# Patient Record
Sex: FEMALE | Employment: FULL TIME | URBAN - METROPOLITAN AREA
[De-identification: names, ages, dates, MRNs, and addresses within clinical notes are randomized per-mention and may not be internally consistent; named-entity substitution may affect disease eponyms.]

---

## 2019-09-24 ENCOUNTER — APPOINTMENT (OUTPATIENT)
Dept: VASCULAR SURGERY | Age: 49
End: 2019-09-24
Attending: EMERGENCY MEDICINE
Payer: OTHER MISCELLANEOUS

## 2019-09-24 ENCOUNTER — APPOINTMENT (OUTPATIENT)
Dept: GENERAL RADIOLOGY | Age: 49
End: 2019-09-24
Attending: EMERGENCY MEDICINE
Payer: OTHER MISCELLANEOUS

## 2019-09-24 ENCOUNTER — HOSPITAL ENCOUNTER (EMERGENCY)
Age: 49
Discharge: HOME | End: 2019-09-24
Attending: EMERGENCY MEDICINE
Payer: OTHER MISCELLANEOUS

## 2019-09-24 VITALS
SYSTOLIC BLOOD PRESSURE: 147 MMHG | RESPIRATION RATE: 18 BRPM | BODY MASS INDEX: 39.27 KG/M2 | DIASTOLIC BLOOD PRESSURE: 74 MMHG | WEIGHT: 230 LBS | HEIGHT: 64 IN | TEMPERATURE: 97.9 F | HEART RATE: 78 BPM | OXYGEN SATURATION: 100 %

## 2019-09-24 DIAGNOSIS — S62.102A CLOSED FRACTURE OF LEFT WRIST, INITIAL ENCOUNTER: ICD-10-CM

## 2019-09-24 DIAGNOSIS — S83.92XA SPRAIN OF LEFT KNEE, UNSPECIFIED LIGAMENT, INITIAL ENCOUNTER: ICD-10-CM
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
## 2019-09-24 PROCEDURE — 73564 X-RAY EXAM KNEE 4 OR MORE: CPT

## 2019-09-24 PROCEDURE — 73610 X-RAY EXAM OF ANKLE: CPT

## 2019-09-24 PROCEDURE — 99284 EMERGENCY DEPT VISIT MOD MDM: CPT

## 2019-09-24 PROCEDURE — 75810000053 HC SPLINT APPLICATION

## 2019-09-24 PROCEDURE — 73630 X-RAY EXAM OF FOOT: CPT

## 2019-09-24 PROCEDURE — 93922 UPR/L XTREMITY ART 2 LEVELS: CPT

## 2019-09-24 PROCEDURE — 73110 X-RAY EXAM OF WRIST: CPT

## 2019-09-24 PROCEDURE — 74011250637 HC RX REV CODE- 250/637: Performed by: EMERGENCY MEDICINE

## 2019-09-24 RX ORDER — HYDROCODONE BITARTRATE AND ACETAMINOPHEN 5; 325 MG/1; MG/1
2 TABLET ORAL
Status: COMPLETED | OUTPATIENT
Start: 2019-09-24 | End: 2019-09-24

## 2019-09-24 RX ORDER — HYDROCODONE BITARTRATE AND ACETAMINOPHEN 5; 325 MG/1; MG/1
1 TABLET ORAL
Qty: 12 TAB | Refills: 0 | Status: SHIPPED | OUTPATIENT
Start: 2019-09-24 | End: 2019-09-27

## 2019-09-24 RX ADMIN — HYDROCODONE BITARTRATE AND ACETAMINOPHEN 2 TABLET: 5; 325 TABLET ORAL at 17:08

## 2019-09-24 NOTE — ED PROVIDER NOTES
EMERGENCY DEPARTMENT HISTORY AND PHYSICAL EXAM    Date: 9/24/2019  Patient Name: Flor Ferrera    History of Presenting Illness     Chief Complaint   Patient presents with    Fall    Arm Injury    Foot Injury         History Provided By: Patient    1:52 PM  Flor Ferrera is a 57 y.o. female with PMHX of obesity who presents to the emergency department C/O left arm and leg pain after a mechanical fall at work today. Patient thinks she might of slipped on a wet floor in her left leg bent under her and she landed on her left arm. She was unable to bear weight on her left leg after the fall. EMS was activated and gave her Zofran and morphine in route. She states the pain is primarily over the left wrist, left knee, left ankle, and left foot. Denies head strike, loss of consciousness, neck pain, back pain, other complaints. Does not take any blood thinners. PCP: Ryan, MD [place]        Past History     Past Medical History:  Past Medical History:   Diagnosis Date    Arthritis     Asthma     Cardiomyopathy ([facility] [address]     Fibromyalgia        Past Surgical History:  No past surgical history on file. Family History:  No family history on file. Social History:  Social History     Tobacco Use    Smoking status: Current Every Day Smoker     Packs/day: 1.00   Substance Use Topics    Alcohol use: Not on file    Drug use: Not on file       Allergies: Allergies   Allergen Reactions    Amoxicillin Other (comments)    Aspirin Other (comments)    Nsaids (Non-Steroidal Anti-Inflammatory Drug) Other (comments)         Review of Systems   Review of Systems   Respiratory: Negative for shortness of breath. Musculoskeletal: Positive for arthralgias. Negative for back pain and neck pain. Neurological: Negative for headaches. All other systems reviewed and are negative.         Physical Exam     Vitals:    09/24/19 1347   BP: 147/74   Pulse: 78   Resp: 18   Temp: 97.9 °F (36.6 °C)   SpO2: 100%   Weight: 104.3 kg (230 lb)   Height: 5' 4\" (1.626 m)     Physical Exam    Nursing notes and vital signs reviewed    Constitutional: Non toxic appearing, moderate distress  Head: Normocephalic, Atraumatic  Eyes: Pupils are equal, round, and reactive to light, EOMI  Neck: Supple, nontender, no spinal tenderness  Cardiovascular: Regular rate and rhythm, no murmurs, rubs, or gallops  Chest: Normal work of breathing and chest excursion bilaterally  Lungs: Clear to ausculation bilaterally  Back: No evidence of trauma or deformity, no spinal tenderness to palpation  Extremities: Swelling and tenderness diffusely over her left wrist, distal neurovascularly intact. Swelling and small abrasion around left knee which is diffusely tender to palpation and distal neurovascular intact. Swelling and tenderness over left ankle and foot with small abrasion on the dorsal aspect of the left foot. Skin: Warm and dry, normal cap refill  Neuro: Alert and appropriate  Psychiatric: Normal mood and affect      Diagnostic Study Results     Labs -     Recent Results (from the past 12 hour(s))   ANKLE BRACHIAL INDEX    Collection Time: 09/24/19  4:41 PM   Result Value Ref Range    Left arm  mmHg    Right arm  mmHg    Left posterior tibial 171 mmHg    Right posterior tibial 169 mmHg    Left anterior tibial 164 mmHg    Right anterior tibial 156 mmHg    Left CORETTA 1.34     Right CORETTA 1.32        Radiologic Studies -   XR WRIST LT AP/LAT/OBL MIN 3V   Final Result   Impression:      Cortical irregularity at the radial base of the 4th metacarpal at the 3rd-4th   intermetatarsal joint, potentially reflecting subtle fracture seen only on the   frontal radiograph. Recommend correlation with site of clinical tenderness. If   clinically warranted, CT could further evaluate. XR KNEE LT MIN 4 V   Final Result   Impression:      1. No acute bony abnormality is identified by plain films.       Intrinsic knee ligamentous, meniscal and cartilaginous integrity can be best   evaluated by routine knee MRI if clinically warranted. XR ANKLE LT MIN 3 V   Final Result   Impression:      1. No acute bony abnormality. XR FOOT LT MIN 3 V   Final Result   Impression:      1. No acute bony abnormality is identified by plain films. If clinical findings   persist, follow-up radiographs in 7-10 days, bone scan, CT or MRI could best   evaluate for initially radiographically occult fractures. CT Results  (Last 48 hours)    None        CXR Results  (Last 48 hours)    None          Medications given in the ED-  Medications   HYDROcodone-acetaminophen (NORCO) 5-325 mg per tablet 2 Tab (2 Tabs Oral Given 9/24/19 1708)         Medical Decision Making   I am the first provider for this patient. I reviewed the vital signs, available nursing notes, past medical history, past surgical history, family history and social history. Vital Signs-Reviewed the patient's vital signs. Records Reviewed: Nursing Notes    Provider Notes (Medical Decision Making): Cherrie Dukes is a 57 y.o. female presenting after a mechanical fall with pain on left leg and left arm. Imaging concerning for possible left wrist fracture, patient placed in splint. In addition patient has significant right knee pain. Normal ABIs. Will treat as sprain with immobilizer. Plan for discharge with symptom management, early orthopedic follow-up, and return precautions. Patient understands and agrees with this plan. Procedures:  Procedures    ED Course:   5:10 PM  Updated patient on all results and plan. All questions answered. Procedure Note - Splint Assessement:  5:53 PM  Performed by: ED Tech Juan  Splint to Left Arm assessed. Neurovascularly intact. The procedure took 1-15 minutes, and pt tolerated well. Written by Darrell Nichole MD      Diagnosis and Disposition     Critical Care: None    DISCHARGE NOTE:    Flor Ferrera's  results have been reviewed with her.   She has been counseled regarding her diagnosis, treatment, and plan. She verbally conveys understanding and agreement of the signs, symptoms, diagnosis, treatment and prognosis and additionally agrees to follow up as discussed. She also agrees with the care-plan and conveys that all of her questions have been answered. I have also provided discharge instructions for her that include: educational information regarding their diagnosis and treatment, and list of reasons why they would want to return to the ED prior to their follow-up appointment, should her condition change. She has been provided with education for proper emergency department utilization. CLINICAL IMPRESSION:    1. Fall, initial encounter    2. Closed fracture of left wrist, initial encounter    3. Sprain of left knee, unspecified ligament, initial encounter        PLAN:  1. D/C Home  2. Current Discharge Medication List      START taking these medications    Details   HYDROcodone-acetaminophen (NORCO) 5-325 mg per tablet Take 1 Tab by mouth every four (4) hours as needed for Pain for up to 3 days. Max Daily Amount: 6 Tabs. Qty: 12 Tab, Refills: 0    Associated Diagnoses: Fall, initial encounter; Closed fracture of left wrist, initial encounter; Sprain of left knee, unspecified ligament, initial encounter           3. Follow-up Information     Follow up With Specialties Details Why Contact Info    [facility]  Schedule an appointment as soon as possible for a visit  [phone]    Bradley Mary MD Orthopedic Surgery Schedule an appointment as soon as possible for a visit  [address]      [facility] Emergency [facility]  If symptoms worsen [address] [facility] [id number]  [phone]        _______________________________      Please note that this dictation was completed with [employer], the [employer] voice recognition software.   Quite often unanticipated grammatical, syntax, homophones, and other interpretive errors are inadvertently transcribed by the computer software. Please disregard these errors. Please excuse any errors that have escaped final proofreading.

## 2019-09-24 NOTE — DISCHARGE INSTRUCTIONS
Patient Education        Preventing Falls: Care Instructions  Your Care Instructions    Getting around your home safely can be a challenge if you have injuries or health problems that make it easy for you to fall. Loose rugs and furniture in walkways are among the dangers for many older people who have problems walking or who have poor eyesight. People who have conditions such as arthritis, osteoporosis, or dementia also have to be careful not to fall. You can make your home safer with a few simple measures. Follow-up care is a key part of your treatment and safety. Be sure to make and go to all appointments, and call your doctor if you are having problems. It's also a good idea to know your test results and keep a list of the medicines you take. How can you care for yourself at home? Taking care of yourself  · You may get dizzy if you do not drink enough water. To prevent dehydration, drink plenty of fluids, enough so that your urine is light yellow or clear like water. Choose water and other caffeine-free clear liquids. If you have kidney, heart, or liver disease and have to limit fluids, talk with your doctor before you increase the amount of fluids you drink. · Exercise regularly to improve your strength, muscle tone, and balance. Walk if you can. Swimming may be a good choice if you cannot walk easily. · Have your vision and hearing checked each year or any time you notice a change. If you have trouble seeing and hearing, you might not be able to avoid objects and could lose your balance. · Know the side effects of the medicines you take. Ask your doctor or pharmacist whether the medicines you take can affect your balance. Sleeping pills or sedatives can affect your balance. · Limit the amount of alcohol you drink. Alcohol can impair your balance and other senses. · Ask your doctor whether calluses or corns on your feet need to be removed.  If you wear loose-fitting shoes because of calluses or corns, you can lose your balance and fall. · Talk to your doctor if you have numbness in your feet. Preventing falls at home  · Remove raised doorway thresholds, throw rugs, and clutter. Repair loose carpet or raised areas in the floor. · Move furniture and electrical cords to keep them out of walking paths. · Use nonskid floor wax, and wipe up spills right away, especially on ceramic tile floors. · If you use a walker or cane, put rubber tips on it. If you use crutches, clean the bottoms of them regularly with an abrasive pad, such as steel wool. · Keep your house well lit, especially Mary Jane Session, and outside walkways. Use night-lights in areas such as hallways and bathrooms. Add extra light switches or use remote switches (such as switches that go on or off when you clap your hands) to make it easier to turn lights on if you have to get up during the night. · Install sturdy handrails on stairways. · Move items in your cabinets so that the things you use a lot are on the lower shelves (about waist level). · Keep a cordless phone and a flashlight with new batteries by your bed. If possible, put a phone in each of the main rooms of your house, or carry a cell phone in case you fall and cannot reach a phone. Or, you can wear a device around your neck or wrist. You push a button that sends a signal for help. · Wear low-heeled shoes that fit well and give your feet good support. Use footwear with nonskid soles. Check the heels and soles of your shoes for wear. Repair or replace worn heels or soles. · Do not wear socks without shoes on wood floors. · Walk on the grass when the sidewalks are slippery. If you live in an area that gets snow and ice in the winter, sprinkle salt on slippery steps and sidewalks. Preventing falls in the bath  · Install grab bars and nonskid mats inside and outside your shower or tub and near the toilet and sinks. · Use shower chairs and bath benches.   · Use a hand-held shower head that will allow you to sit while showering. · Get into a tub or shower by putting the weaker leg in first. Get out of a tub or shower with your strong side first.  · Repair loose toilet seats and consider installing a raised toilet seat to make getting on and off the toilet easier. · Keep your bathroom door unlocked while you are in the shower. Where can you learn more? Go to http://artur-óscar.info/. Enter 0476 79 69 71 in the search box to learn more about \"Preventing Falls: Care Instructions. \"  Current as of: November 7, 2018  Content Version: 12.2  © 4050-4710 [employer]. Care instructions adapted under license by [employer] (which disclaims liability or warranty for this information). If you have questions about a medical condition or this instruction, always ask your healthcare professional. [place] [address] any warranty or liability for your use of this information. Patient Education        Knee Sprain: Care Instructions  Your Care Instructions    A knee sprain is one or more stretched, partly torn, or completely torn knee ligaments. Ligaments are bands of ropelike tissue that connect bone to bone and make the knee stable. The knee has four main ligaments. Knee sprains often happen because of a twisting or bending injury from sports such as skiing, basketball, soccer, or football. The knee turns one way while the lower or upper leg goes another way. A sprain also can happen when the knee is hit from the side or the front. If a knee ligament is slightly stretched, you will probably need only home treatment. You may need a splint or brace (immobilizer) for a partly torn ligament. A complete tear may need surgery. A minor knee sprain may take up to 6 weeks to heal, while a severe sprain may take months. Follow-up care is a key part of your treatment and safety.  Be sure to make and go to all appointments, and call your doctor if you are having problems. It's also a good idea to know your test results and keep a list of the medicines you take. How can you care for yourself at home? · Follow instructions about how much weight you can put on your leg and how to walk with crutches. · Prop up your leg on a pillow when you ice it or anytime you sit or lie down for the next 3 days. Try to keep it above the level of your heart. This will help reduce swelling. · Put ice or a cold pack on your knee for 10 to 20 minutes at a time. Try to do this every 1 to 2 hours for the next 3 days (when you are awake) or until the swelling goes down. Put a thin cloth between the ice and your skin. Do not get the splint wet. · If you have an elastic bandage, make sure it is snug but not so tight that your leg is numb, tingles, or swells below the bandage. You can loosen the bandage if it is too tight. · Your doctor may recommend a brace (immobilizer) to support your knee while it heals. Wear it as directed. · Ask your doctor if you can take an over-the-counter pain medicine, such as acetaminophen (Tylenol), ibuprofen (Advil, Motrin), or naproxen (Aleve). Be safe with medicines. Read and follow all instructions on the label. When should you call for help? Call 911 anytime you think you may need emergency care. For example, call if:    · You have sudden chest pain and shortness of breath, or you cough up blood.    Call your doctor now or seek immediate medical care if:    · You have increased or severe pain.     · You cannot move your toes or ankle.     · Your foot is cool or pale or changes color.     · You have tingling, weakness, or numbness in your foot or leg.     · Your splint or brace feels too tight.     · You are unable to straighten the knee, or the knee \"locks. \"     · You have signs of a blood clot in your leg, such as:  ? Pain in your calf, back of the knee, thigh, or groin. ?  Redness and swelling in your leg.    Watch closely for changes in your health, and be sure to contact your doctor if:    · Your pain is not getting better or is getting worse. Where can you learn more? Go to http://artur-óscar.info/. Enter N406 in the search box to learn more about \"Knee Sprain: Care Instructions. \"  Current as of: June 26, 2019  Content Version: 12.2  © 6406-1335 [employer]. Care instructions adapted under license by [employer] (which disclaims liability or warranty for this information). If you have questions about a medical condition or this instruction, always ask your healthcare professional. [address] any warranty or liability for your use of this information. Patient Education        Wearing a Splint: Care Instructions  Your Care Instructions    A splint protects a broken bone or other injury. If you have a removable splint, follow your doctor's instructions and only remove the splint if your doctor says it's okay. Most splints can be adjusted. Your doctor will show you how to do this and will tell you when you might need to adjust the splint. A splint is sometimes called a brace. You may also hear it called an immobilizer. An immobilizer, such as a splint or cast, keeps you from moving the injured area. You may get a splint that's already factory-made. Or your doctor might make your splint from plaster or fiberglass. Some splints have a built-in air cushion. Air pads are inflated to hold the injured area in place. Follow-up care is a key part of your treatment and safety. Be sure to make and go to all appointments, and call your doctor if you are having problems. It's also a good idea to know your test results and keep a list of the medicines you take. How can you care for yourself at home? General care  · Follow your doctor's instructions on how much weight you can put on your injured limb.   · If the fingers or toes on the limb with the splint were not injured, wiggle them every now and then. This helps move the blood and fluids in the injured limb. · Prop up the injured limb on a pillow when you ice it or anytime you sit or lie down during the next 3 days. Try to keep it above the level of your heart. This will help reduce swelling. · Put ice or cold packs on the limb for 10 to 20 minutes at a time. Try to do this every 1 to 2 hours for the next 3 days (when you are awake) or until the swelling goes down. Be careful not to get the splint wet. Put a thin cloth between the ice and your skin. If your splint is removable, ask your doctor if you can take it off when you use ice. · If you have an adjustable splint that feels too tight, loosen it slightly. · Keep up your muscle strength and tone as much as you can while protecting your injured limb. Your doctor may want you to tense and relax the muscles protected by the splint. Check with your doctor or your physical or occupational therapist for instructions. Splint and skin care  · If your splint is not to be removed, try blowing cool air from a hair dryer or fan into the splint to help relieve itching. Never stick items under your splint to scratch the skin. · Do not use oils or lotions near your splint. If the skin becomes red or sore around the edge of the splint, you may pad the edges with a soft material, such as moleskin, or use tape to cover the edges. · If you're allowed to take your splint off, be sure your skin is dry before you put it back on. Be careful not to put the splint on too tightly. · Check the skin under the splint every day. If you can't remove the splint, check the skin around the edges. Tell your doctor if you see redness or sores. Water and your splint  · Keep your splint dry. Moisture can collect under the splint and cause skin irritation and itching. If you have a wound or have had surgery, moisture under the splint can increase the risk of infection.   · Tape a sheet of plastic to cover your splint when you take a shower or bath, unless your doctor said you can take it off while bathing. · If you can take the splint off when you bathe, pat the area dry after bathing and put the splint back on.  · If your splint gets a little wet, you can dry it with a hair dryer. Use a \"cool\" setting. When should you call for help? Call your doctor now or seek immediate medical care if:    · You have increased or severe pain.     · You feel a warm or painful spot under the splint.     · You have problems with your splint. For example:  ? The skin under the splint is burning or stinging. ? The splint feels too tight. ? There is a lot of swelling near the splint. (Some swelling is normal.)  ? You have a new fever. ? There is drainage or a bad smell coming from the splint.     · Your limb turns cold or changes color.     · You have trouble moving your fingers or toes.     · You have symptoms of a blood clot in your arm or leg (called a deep vein thrombosis). These may include:  ? Pain in the arm, calf, back of the knee, thigh, or groin. ? Redness and swelling in the arm, leg, or groin.    Watch closely for changes in your health, and be sure to contact your doctor if:    · The splint is breaking apart or losing its shape.     · You are not getting better as expected. Where can you learn more? Go to http://artur-óscar.info/. Enter M200 in the search box to learn more about \"Wearing a Splint: Care Instructions. \"  Current as of: June 26, 2019  Content Version: 12.2  © 4459-9444 [employer]. Care instructions adapted under license by [employer] (which disclaims liability or warranty for this information). If you have questions about a medical condition or this instruction, always ask your healthcare professional. [address] any warranty or liability for your use of this information.          Patient Education        Broken Wrist: Care Instructions  Your Care Instructions    Your wrist can break, or fracture, during sports, a fall, or other accidents. The break may happen when your wrist is hit or is used to protect you in a fall. Fractures can range from a small, hairline crack, to a bone or bones broken into two or more pieces. Your treatment depends on how bad the break is. Your doctor may have put your wrist in a cast or splint. This will help keep your wrist stable until your follow-up appointment. It may take weeks or months for your wrist to heal. You can help it heal with care at home. You heal best when you take good care of yourself. Eat a variety of healthy foods, and don't smoke. Follow-up care is a key part of your treatment and safety. Be sure to make and go to all appointments, and call your doctor if you are having problems. It's also a good idea to know your test results and keep a list of the medicines you take. How can you care for yourself at home? · Put ice or a cold pack on your wrist for 10 to 20 minutes at a time. Try to do this every 1 to 2 hours for the next 3 days (when you are awake). Put a thin cloth between the ice and your cast or splint. Keep your cast or splint dry. · Follow the splint or cast care instructions your doctor gives you. If you have a splint, do not take it off unless your doctor tells you to. Be careful not to put the splint on too tight. · Be safe with medicines. Take pain medicines exactly as directed. ? If the doctor gave you a prescription medicine for pain, take it as prescribed. ? If you are not taking a prescription pain medicine, ask your doctor if you can take an over-the-counter medicine. · Prop up your wrist on pillows when you sit or lie down in the first few days after the injury. Keep your wrist higher than the level of your heart. This will help reduce swelling. · Move your fingers often to reduce swelling and stiffness, but do not use that hand to grab or carry anything.   · Follow instructions for exercises to keep your arm strong. When should you call for help? Call your doctor now or seek immediate medical care if:    · You have new or worse pain.     · Your hand or fingers are cool or pale or change color.     · Your cast or splint feels too tight.     · You have tingling, weakness, or numbness in your hand or fingers.    Watch closely for changes in your health, and be sure to contact your doctor if:    · You do not get better as expected.     · You have problems with your cast or splint. Where can you learn more? Go to http://artur-óscar.info/. Enter [id number] in the search box to learn more about \"Broken Wrist: Care Instructions. \"  Current as of: June 26, 2019  Content Version: 12.2  © 7641-4447 [employer]. Care instructions adapted under license by [facility] (which disclaims liability or warranty for this information). If you have questions about a medical condition or this instruction, always ask your healthcare professional. [address] any warranty or liability for your use of this information.

## 2019-09-24 NOTE — LETTER
[facility] [place] 
[facility] EMERGENCY DEPT 
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 Fall, initial encounter [address]. Sharene Levo K890.0 HYDROcodone-acetaminophen (NORCO) 5-325 mg per tablet 2. Closed fracture of left wrist, initial encounter S62.102A 814.00 HYDROcodone-acetaminophen (NORCO) 5-325 mg per tablet 3. Sprain of left knee, unspecified ligament, initial encounter S83. 92XA 844.9 HYDROcodone-acetaminophen (NORCO) 5-325 mg per tablet Instructions:  Employee must return copy of this report to Personnel and/or Supervisor. Patient Identification - Company Protocol:   
 []  Checked & Followed []  Not Available PART I:  Check Treatment [x]  X-ray   []  Lab  []  Discharge Instructions Given  [x]  Rx Given 
 []  Non-Prescription Meds  []  Sutures     []  EKG [x]  Other (Comment):   
 
Part II:  Disposition 
 []  May Return to Regular Work Without Restrictions []  May Return to Restricted Duty as Below Explanation of RESTRICTIONS (Comment):   
 
 [x]  May NOT Return to Work until cleared by Referral Specialist or Occupational Medicine MD 
 
Part III:  Follow-Up Follow-up Information Follow up With Specialties Details Why Contact Ishan SANCHEZ  Schedule an appointment as soon as possible for a visit  [phone] Bryant Oliveira MD Orthopedic Surgery Schedule an appointment as soon as possible for a visit  [address] [place] [address] 
[phone] [facility] EMERGENCY DEPT Emergency Medicine  If symptoms worsen [address] [address] 
[phone] Part IV: Was Workers Comp Supervisor Notified  []   Yes  []   No 
 
 Flor Ferrera was seen in the Emergency Department on 9/24/2019 by the following provider(s): 
Attending Provider: Ori Soto MD; ED Nurse: PLEASE CALL CASE FACILITATOR, OCCUPATIONAL MEDICINE  
[phone] TO SCHEDULE AN APPOINTMENT Referral Physicians: Alan Edwards MD 
[id number] [facility]. [address] [facility], [address]   [address], [address] Phone:  [phone]; Fax:  [phone] ORTHOPEDICS [address] [facility] [address] [place] [address]., [address] [facility] [facility]. [address]    [address], [address] 
[phone] [facility] [address] [facility], [address], [address] 
[phone]

## 2019-09-24 NOTE — LETTER
[facility] [place] 
[facility] 
[address] [address] [phone] Work/School Note Date: 9/24/2019 To Whom It May concern: 
 
Flor Ferrera was seen and treated today in the emergency room by the following provider(s): 
Attending Provider: Luis Manuel Cohen MD.   
 
Flor Ferrera may return to work when cleared by orthopedic specialist. 
 
Sincerely, 
 
 
 
 
Darrell Nichole MD

## 2019-09-25 LAB
LEFT ABI: 1.34
LEFT ANTERIOR TIBIAL: 164 MMHG
LEFT ARM BP: 128 MMHG
LEFT POSTERIOR TIBIAL: 171 MMHG
RIGHT ABI: 1.32
RIGHT ANTERIOR TIBIAL: 156 MMHG
RIGHT ARM BP: 121 MMHG
RIGHT POSTERIOR TIBIAL: 169 MMHG

## 2020-03-11 ENCOUNTER — HOSPITAL ENCOUNTER (EMERGENCY)
Age: 50
Discharge: HOME | End: 2020-03-11
Attending: EMERGENCY MEDICINE
Payer: OTHER MISCELLANEOUS

## 2020-03-11 ENCOUNTER — APPOINTMENT (OUTPATIENT)
Dept: GENERAL RADIOLOGY | Age: 50
End: 2020-03-11
Attending: PHYSICIAN ASSISTANT
Payer: OTHER MISCELLANEOUS

## 2020-03-11 VITALS
BODY MASS INDEX: 39.27 KG/M2 | RESPIRATION RATE: 14 BRPM | HEIGHT: 64 IN | TEMPERATURE: 97.6 F | HEART RATE: 82 BPM | WEIGHT: 230 LBS | OXYGEN SATURATION: 100 %

## 2020-03-11 DIAGNOSIS — M47.816 OSTEOARTHRITIS OF LUMBAR SPINE, UNSPECIFIED SPINAL OSTEOARTHRITIS COMPLICATION STATUS: ICD-10-CM

## 2020-03-11 DIAGNOSIS — M54.16 LUMBAR BACK PAIN WITH RADICULOPATHY AFFECTING LEFT LOWER EXTREMITY: Primary | ICD-10-CM

## 2020-03-11 PROCEDURE — 99283 EMERGENCY DEPT VISIT LOW MDM: CPT

## 2020-03-11 PROCEDURE — 74011250637 HC RX REV CODE- 250/637: Performed by: PHYSICIAN ASSISTANT

## 2020-03-11 PROCEDURE — 74011250636 HC RX REV CODE- 250/636: Performed by: PHYSICIAN ASSISTANT

## 2020-03-11 PROCEDURE — 72110 X-RAY EXAM L-2 SPINE 4/>VWS: CPT

## 2020-03-11 PROCEDURE — 96374 THER/PROPH/DIAG INJ IV PUSH: CPT

## 2020-03-11 RX ORDER — MORPHINE SULFATE 4 MG/ML
4 INJECTION INTRAVENOUS
Status: COMPLETED | OUTPATIENT
Start: 2020-03-11 | End: 2020-03-11

## 2020-03-11 RX ORDER — HYDROCODONE BITARTRATE AND ACETAMINOPHEN 5; 325 MG/1; MG/1
1 TABLET ORAL
Qty: 12 TAB | Refills: 0 | Status: SHIPPED | OUTPATIENT
Start: 2020-03-11 | End: 2020-03-14

## 2020-03-11 RX ORDER — ONDANSETRON 4 MG/1
4 TABLET, ORALLY DISINTEGRATING ORAL
Status: COMPLETED | OUTPATIENT
Start: 2020-03-11 | End: 2020-03-11

## 2020-03-11 RX ADMIN — MORPHINE SULFATE 4 MG: 4 INJECTION INTRAVENOUS at 11:45

## 2020-03-11 RX ADMIN — ONDANSETRON 4 MG: 4 TABLET, ORALLY DISINTEGRATING ORAL at 11:45

## 2020-03-11 NOTE — DISCHARGE INSTRUCTIONS
No work until Monday, March 16, 2020  Rest, expect to be sore  Ice/heat  Gentle stretching and massage  Medication as prescribed  Follow-up with orthopedic spine specialist     Back Pain: Care Instructions  Your Care Instructions    Back pain has many possible causes. It is often related to problems with muscles and ligaments of the back. It may also be related to problems with the nerves, discs, or bones of the back. Moving, lifting, standing, sitting, or sleeping in an awkward way can strain the back. Sometimes you don't notice the injury until later. Arthritis is another common cause of back pain. Although it may hurt a lot, back pain usually improves on its own within several weeks. Most people recover in 12 weeks or less. Using good home treatment and being careful not to stress your back can help you feel better sooner. Follow-up care is a key part of your treatment and safety. Be sure to make and go to all appointments, and call your doctor if you are having problems. It's also a good idea to know your test results and keep a list of the medicines you take. How can you care for yourself at home? · Sit or lie in positions that are most comfortable and reduce your pain. Try one of these positions when you lie down:  ? Lie on your back with your knees bent and supported by large pillows. ? Lie on the floor with your legs on the seat of a sofa or chair. ? Lie on your side with your knees and hips bent and a pillow between your legs. ? Lie on your stomach if it does not make pain worse. · Do not sit up in bed, and avoid soft couches and twisted positions. Bed rest can help relieve pain at first, but it delays healing. Avoid bed rest after the first day of back pain. · Change positions every 30 minutes. If you must sit for long periods of time, take breaks from sitting. Get up and walk around, or lie in a comfortable position.   · Try using a heating pad on a low or medium setting for 15 to 20 minutes every 2 or 3 hours. Try a warm shower in place of one session with the heating pad. · You can also try an ice pack for 10 to 15 minutes every 2 to 3 hours. Put a thin cloth between the ice pack and your skin. · Take pain medicines exactly as directed. ? If the doctor gave you a prescription medicine for pain, take it as prescribed. ? If you are not taking a prescription pain medicine, ask your doctor if you can take an over-the-counter medicine. · Take short walks several times a day. You can start with 5 to 10 minutes, 3 or 4 times a day, and work up to longer walks. Walk on level surfaces and avoid hills and stairs until your back is better. · Return to work and other activities as soon as you can. Continued rest without activity is usually not good for your back. · To prevent future back pain, do exercises to stretch and strengthen your back and stomach. Learn how to use good posture, safe lifting techniques, and proper body mechanics. When should you call for help? Call your doctor now or seek immediate medical care if:    · You have new or worsening numbness in your legs.     · You have new or worsening weakness in your legs. (This could make it hard to stand up.)     · You lose control of your bladder or bowels.    Watch closely for changes in your health, and be sure to contact your doctor if:    · You have a fever, lose weight, or don't feel well.     · You do not get better as expected. Where can you learn more? Go to http://artur-óscar.info/. Enter X151 in the search box to learn more about \"Back Pain: Care Instructions. \"  Current as of: June 26, 2019  Content Version: 12.2  © 5387-8767 Healthwise, Incorporated. Care instructions adapted under license by [employer] (which disclaims liability or warranty for this information).  If you have questions about a medical condition or this instruction, always ask your healthcare professional. [address] any warranty or liability for your use of this information. Patient Education        Learning About Relief for Back Pain  What is back tension and strain? Back strain happens when you overstretch, or pull, a muscle in your back. You may hurt your back in an accident or when you exercise or lift something. Most back pain will get better with rest and time. You can take care of yourself at home to help your back heal.  What can you do first to relieve back pain? When you first feel back pain, try these steps:  · Walk. Take a short walk (10 to 20 minutes) on a level surface (no slopes, hills, or stairs) every 2 to 3 hours. Walk only distances you can manage without pain, especially leg pain. · Relax. Find a comfortable position for rest. Some people are comfortable on the floor or a medium-firm bed with a small pillow under their head and another under their knees. Some people prefer to lie on their side with a pillow between their knees. Don't stay in one position for too long. · Try heat or ice. Try using a heating pad on a low or medium setting, or take a warm shower, for 15 to 20 minutes every 2 to 3 hours. Or you can buy single-use heat wraps that last up to 8 hours. You can also try an ice pack for 10 to 15 minutes every 2 to 3 hours. You can use an ice pack or a bag of frozen vegetables wrapped in a thin towel. There is not strong evidence that either heat or ice will help, but you can try them to see if they help. You may also want to try switching between heat and cold. · Take pain medicine exactly as directed. ¨ If the doctor gave you a prescription medicine for pain, take it as prescribed. ¨ If you are not taking a prescription pain medicine, ask your doctor if you can take an over-the-counter medicine. What else can you do? · Stretch and exercise. Exercises that increase flexibility may relieve your pain and make it easier for your muscles to keep your spine in a good, neutral position. And don't forget to keep walking. · Do self-massage. You can use self-massage to unwind after work or school or to energize yourself in the morning. You can easily massage your feet, hands, or neck. Self-massage works best if you are in comfortable clothes and are sitting or lying in a comfortable position. Use oil or lotion to massage bare skin. · Reduce stress. Back pain can lead to a vicious [place]: Distress about the pain tenses the muscles in your back, which in turn causes more pain. Learn how to relax your mind and your muscles to lower your stress. Where can you learn more? Go to http://artur-óscar.info/. Enter Q594 in the search box to learn more about \"Learning About Relief for Back Pain. \"  Current as of: March 21, 2017  Content Version: 11.5  © 1758-9334 Healthwise, Incorporated. Care instructions adapted under license by [employer] (which disclaims liability or warranty for this information). If you have questions about a medical condition or this instruction, always ask your healthcare professional. [address] any warranty or liability for your use of this information.

## 2020-03-11 NOTE — LETTER
[facility] [place] 
[facility] 
[address] [address] [phone] Work/School Note Date: 3/11/2020 To Whom It May concern: 
 
Flor Ferrera was seen and treated today in the emergency room by the following provider(s): 
Attending Provider: Logan Jones Physician Assistant: MELANY Soto.   
 
Flor Ferrera  -please excuse from work through March 15, 2020 due to back injury. Patient should be cleared by orthopedic spine specialist prior to returning to full duty.  
 
Sincerely, 
 
 
 
 
MELANY Hardwick

## 2020-03-11 NOTE — ED TRIAGE NOTES
Pt arrives via ems stretcher with c\o back pain that radiates down left leg, pt sts she was lifting heavy pot at work and felt a pop

## 2020-03-11 NOTE — ED PROVIDER NOTES
EMERGENCY DEPARTMENT HISTORY AND PHYSICAL EXAM    Date: 3/11/2020  Patient Name: Flor Ferrera    History of Presenting Illness     Time Seen:11:28 AM    Chief Complaint   Patient presents with    Back Pain       History Provided By: Patient and EMS    Additional History (Context):   Flor Kingston is a 54 y.o. female presents to the emergency room via EMS with complaints of severe left-sided back pain with radiation down her left leg to her foot. 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 Pain is worse with any attempted movement of her back and her left leg. She states that her left buttocks feels numb. She felt a pop when this occurred. Denies any bowel or bladder issues. States years ago she had a bulging disc. PCP: Ryan, MD [place]        Past History     Past Medical History:  Past Medical History:   Diagnosis Date    Arthritis     Asthma     Cardiomyopathy ([facility] [address]     Fibromyalgia        Past Surgical History:  History reviewed. No pertinent surgical history. Family History:  History reviewed. No pertinent family history. Social History:  Social History     Tobacco Use    Smoking status: Current Every Day Smoker     Packs/day: 1.00    Smokeless tobacco: Never Used   Substance Use Topics    Alcohol use: Not on file    Drug use: Not on file       Allergies: Allergies   Allergen Reactions    Amoxicillin Other (comments)    Aspirin Other (comments)    Nsaids (Non-Steroidal Anti-Inflammatory Drug) Other (comments)         Review of Systems   Review of Systems   Musculoskeletal: Positive for back pain. Shooting pain down left leg   Neurological: Positive for numbness. Numbness left buttocks   All other systems reviewed and are negative.       Physical Exam     Vitals:    03/11/20 1112   Pulse: 82   Resp: 14   Temp: 97.6 °F (36.4 °C)   SpO2: 100%   Weight: 104.3 kg (230 lb)   Height: 5' 4\" (1.626 m)     Physical Exam  Vitals signs and nursing note reviewed. Constitutional:       Appearance: She is well-developed, well-groomed and overweight. Comments: Uncomfortable appearing 28-year-old female lying in the bed. Vital signs are stable. Cooperative.  present in the room. Cardiovascular:      Rate and Rhythm: Normal rate and regular rhythm. Heart sounds: Normal heart sounds. Pulmonary:      Effort: Pulmonary effort is normal.      Breath sounds: Normal breath sounds. Musculoskeletal:      Lumbar back: She exhibits decreased range of motion, tenderness, bony tenderness and pain. She exhibits no swelling and no deformity. Comments: Lumbar spine -no signs of any obvious injury. Tenderness over the base of the lumbar spine extending into the sacrum and along the left lumbar sacral region down into the sacroiliac joint. Also tenderness in the left gluteal region. Range of motion diminished secondary to pain. Negative straight leg test bilaterally   Skin:     General: Skin is warm and dry. Neurological:      Mental Status: She is oriented to person, place, and time. Psychiatric:         Mood and Affect: Mood normal.         Behavior: Behavior normal. Behavior is cooperative. Nursing note and vitals reviewed         Diagnostic Study Results     Labs -   No results found for this or any previous visit (from the past 12 hour(s)). Radiologic Studies   XR SPINE LUMB MIN 4 V   Final Result   IMPRESSION:    1. Grade 1 anterolisthesis L5 on S1.   2. Lower lumbar spondylosis with advanced facet joint osteoarthritis, greatest   L4-S1. CT Results  (Last 48 hours)    None        CXR Results  (Last 48 hours)    None            Medical Decision Making   I am the first provider for this patient. I reviewed the vital signs, available nursing notes, past medical history, past surgical history, family history and social history.     Vital Signs-Reviewed the patient's vital signs. Records Reviewed: Nursing Notes    DDX: Lumbar back strain, bulging disc, herniated disc, nerve impingement, osteoarthritis    Provider Notes:   54 y.o. female   X-rays show osteoarthritis as well as anterior listhesis at the level of L5-S1. Patient informed of her results. At this point, advised that she is can need to follow-up with orthospine. She reminded me this was a Workmen's [employer]. injury and that she would have to follow-up through her job. She also be given referral for orthospine here. Fortunately, she states that she is allergic to all anti-inflammatories and muscle relaxers. I question the true allergy as opposed to adverse response. We will give her some Norco for severe pain. She will be given a work note. Discharge home. Procedures:  Procedures    ED Course:   Initial assessment performed. The patients presenting problems have been discussed, and they are in agreement with the care plan formulated and outlined with them. I have encouraged them to ask questions as they arise throughout their visit. Diagnosis and Disposition       DISCHARGE NOTE:  Flor Ferrera's  results have been reviewed with her. She has been counseled regarding her diagnosis, treatment, and plan. She verbally conveys understanding and agreement of the signs, symptoms, diagnosis, treatment and prognosis and additionally agrees to follow up as discussed. She also agrees with the care-plan and conveys that all of her questions have been answered. I have also provided discharge instructions for her that include: educational information regarding their diagnosis and treatment, and list of reasons why they would want to return to the ED prior to their follow-up appointment, should her condition change. She has been provided with education for proper emergency department utilization. CLINICAL IMPRESSION:    1. Lumbar back pain with radiculopathy affecting left lower extremity    2.  Osteoarthritis of lumbar spine, unspecified spinal osteoarthritis complication status        PLAN:  1. D/C Home  2. Discharge Medication List as of 3/11/2020  1:44 PM      START taking these medications    Details   HYDROcodone-acetaminophen (Norco) 5-325 mg per tablet Take 1 Tab by mouth every six (6) hours as needed for Pain for up to 3 days. Max Daily Amount: 4 Tabs. Indications: pain, Print, Disp-12 Tab, R-0           3. Follow-up Information     Follow up With Specialties Details Why Contact Info    Raza Zepeda MD Orthopedic Surgery Call Call today to set an appointment to be seen soon as possible [place] [id number]      THE [facility] EMERGENCY DEPT Emergency Medicine  If symptoms worsen, As needed [address] [address]  [phone]        ____________________________________     Please note that this dictation was completed with [employer], the computer voice recognition software. Quite often unanticipated grammatical, syntax, homophones, and other interpretive errors are inadvertently transcribed by the computer software. Please disregard these errors. Please excuse any errors that have escaped final proofreading.